# Patient Record
Sex: FEMALE | Race: OTHER | Employment: FULL TIME | URBAN - NONMETROPOLITAN AREA
[De-identification: names, ages, dates, MRNs, and addresses within clinical notes are randomized per-mention and may not be internally consistent; named-entity substitution may affect disease eponyms.]

---

## 2018-02-28 ENCOUNTER — HOSPITAL ENCOUNTER (INPATIENT)
Age: 18
LOS: 9 days | Discharge: HOME | DRG: 885 | End: 2018-03-09
Attending: EMERGENCY MEDICINE | Admitting: PSYCHIATRY & NEUROLOGY
Payer: COMMERCIAL

## 2018-02-28 DIAGNOSIS — F22 PARANOIA (HCC): Primary | ICD-10-CM

## 2018-02-28 LAB
ALBUMIN SERPL-MCNC: 4.3 G/DL (ref 3.5–5.2)
ALP BLD-CCNC: 48 U/L (ref 35–104)
ALT SERPL-CCNC: 10 U/L (ref 5–33)
AMPHETAMINE SCREEN, URINE: NEGATIVE
ANION GAP SERPL CALCULATED.3IONS-SCNC: 16 MMOL/L (ref 7–19)
AST SERPL-CCNC: 27 U/L (ref 5–32)
BARBITURATE SCREEN URINE: NEGATIVE
BASOPHILS ABSOLUTE: 0 K/UL (ref 0–0.2)
BASOPHILS RELATIVE PERCENT: 0.7 % (ref 0–1)
BENZODIAZEPINE SCREEN, URINE: NEGATIVE
BILIRUB SERPL-MCNC: 0.6 MG/DL (ref 0.2–1.2)
BILIRUBIN URINE: NEGATIVE
BLOOD, URINE: NEGATIVE
BUN BLDV-MCNC: 9 MG/DL (ref 6–20)
CALCIUM SERPL-MCNC: 9.3 MG/DL (ref 8.6–10)
CANNABINOID SCREEN URINE: NEGATIVE
CHLORIDE BLD-SCNC: 98 MMOL/L (ref 98–111)
CLARITY: ABNORMAL
CO2: 23 MMOL/L (ref 22–29)
COCAINE METABOLITE SCREEN URINE: NEGATIVE
COLOR: ABNORMAL
CREAT SERPL-MCNC: 0.6 MG/DL (ref 0.5–0.9)
EOSINOPHILS ABSOLUTE: 0 K/UL (ref 0–0.6)
EOSINOPHILS RELATIVE PERCENT: 0.5 % (ref 0–5)
ETHANOL: <10 MG/DL (ref 0–0.08)
GFR NON-AFRICAN AMERICAN: >60
GLUCOSE BLD-MCNC: 86 MG/DL (ref 74–109)
GLUCOSE URINE: NEGATIVE MG/DL
HCG(URINE) PREGNANCY TEST: NEGATIVE
HCT VFR BLD CALC: 39.8 % (ref 37–47)
HEMOGLOBIN: 12.8 G/DL (ref 12–16)
KETONES, URINE: NEGATIVE MG/DL
LEUKOCYTE ESTERASE, URINE: NEGATIVE
LYMPHOCYTES ABSOLUTE: 2.1 K/UL (ref 1.1–4.5)
LYMPHOCYTES RELATIVE PERCENT: 35.3 % (ref 20–40)
Lab: NORMAL
MCH RBC QN AUTO: 29.2 PG (ref 27–31)
MCHC RBC AUTO-ENTMCNC: 32.2 G/DL (ref 33–37)
MCV RBC AUTO: 90.9 FL (ref 81–99)
MONOCYTES ABSOLUTE: 0.3 K/UL (ref 0–0.9)
MONOCYTES RELATIVE PERCENT: 5.3 % (ref 0–10)
NEUTROPHILS ABSOLUTE: 3.4 K/UL (ref 1.5–7.5)
NEUTROPHILS RELATIVE PERCENT: 58 % (ref 50–65)
NITRITE, URINE: NEGATIVE
OPIATE SCREEN URINE: NEGATIVE
PDW BLD-RTO: 12.9 % (ref 11.5–14.5)
PH UA: 7.5
PLATELET # BLD: 223 K/UL (ref 130–400)
PMV BLD AUTO: 10.8 FL (ref 9.4–12.3)
POTASSIUM SERPL-SCNC: 4 MMOL/L (ref 3.5–5)
PROTEIN UA: ABNORMAL MG/DL
RBC # BLD: 4.38 M/UL (ref 4.2–5.4)
SALICYLATE, SERUM: <3 MG/DL (ref 3–10)
SODIUM BLD-SCNC: 137 MMOL/L (ref 136–145)
SPECIFIC GRAVITY UA: 1.02
TOTAL PROTEIN: 7.3 G/DL (ref 6.6–8.7)
UROBILINOGEN, URINE: 1 E.U./DL
WBC # BLD: 5.8 K/UL (ref 4.8–10.8)

## 2018-02-28 PROCEDURE — 81025 URINE PREGNANCY TEST: CPT

## 2018-02-28 PROCEDURE — 1240000000 HC EMOTIONAL WELLNESS R&B

## 2018-02-28 PROCEDURE — G0480 DRUG TEST DEF 1-7 CLASSES: HCPCS

## 2018-02-28 PROCEDURE — 85025 COMPLETE CBC W/AUTO DIFF WBC: CPT

## 2018-02-28 PROCEDURE — 99285 EMERGENCY DEPT VISIT HI MDM: CPT

## 2018-02-28 PROCEDURE — 80307 DRUG TEST PRSMV CHEM ANLYZR: CPT

## 2018-02-28 PROCEDURE — 99284 EMERGENCY DEPT VISIT MOD MDM: CPT | Performed by: EMERGENCY MEDICINE

## 2018-02-28 PROCEDURE — 80053 COMPREHEN METABOLIC PANEL: CPT

## 2018-02-28 PROCEDURE — 36415 COLL VENOUS BLD VENIPUNCTURE: CPT

## 2018-02-28 PROCEDURE — 81003 URINALYSIS AUTO W/O SCOPE: CPT

## 2018-02-28 RX ORDER — TRAZODONE HYDROCHLORIDE 50 MG/1
50 TABLET ORAL ONCE
Status: CANCELLED | OUTPATIENT
Start: 2018-02-28

## 2018-02-28 ASSESSMENT — ENCOUNTER SYMPTOMS
COLOR CHANGE: 0
CHEST TIGHTNESS: 0
ABDOMINAL PAIN: 0
SHORTNESS OF BREATH: 0
EYE PAIN: 0
DIARRHEA: 0
SORE THROAT: 0
PHOTOPHOBIA: 0
TROUBLE SWALLOWING: 0
CONSTIPATION: 0
ABDOMINAL DISTENTION: 0
COUGH: 0
NAUSEA: 0
BACK PAIN: 0
VOMITING: 0
RHINORRHEA: 0
WHEEZING: 0

## 2018-03-01 PROBLEM — F41.0 PANIC DISORDER: Status: ACTIVE | Noted: 2018-03-01

## 2018-03-01 PROBLEM — F32.3 SEVERE MAJOR DEPRESSION, SINGLE EPISODE, WITH PSYCHOTIC FEATURES (HCC): Status: ACTIVE | Noted: 2018-03-01

## 2018-03-01 PROBLEM — F12.11 CANNABIS ABUSE, IN REMISSION: Status: ACTIVE | Noted: 2018-03-01

## 2018-03-01 PROCEDURE — 1240000000 HC EMOTIONAL WELLNESS R&B

## 2018-03-01 PROCEDURE — 6370000000 HC RX 637 (ALT 250 FOR IP): Performed by: PSYCHIATRY & NEUROLOGY

## 2018-03-01 PROCEDURE — 90792 PSYCH DIAG EVAL W/MED SRVCS: CPT | Performed by: PSYCHIATRY & NEUROLOGY

## 2018-03-01 RX ORDER — ACETAMINOPHEN 325 MG/1
650 TABLET ORAL EVERY 4 HOURS PRN
Status: DISCONTINUED | OUTPATIENT
Start: 2018-03-01 | End: 2018-03-09 | Stop reason: HOSPADM

## 2018-03-01 RX ORDER — ZIPRASIDONE HYDROCHLORIDE 20 MG/1
40 CAPSULE ORAL NIGHTLY
Status: DISCONTINUED | OUTPATIENT
Start: 2018-03-01 | End: 2018-03-02

## 2018-03-01 RX ORDER — FLUOXETINE 10 MG/1
10 CAPSULE ORAL DAILY
Status: ON HOLD | COMMUNITY
End: 2018-03-09 | Stop reason: HOSPADM

## 2018-03-01 RX ORDER — HYDROXYZINE HYDROCHLORIDE 25 MG/1
25 TABLET, FILM COATED ORAL 3 TIMES DAILY PRN
Status: DISCONTINUED | OUTPATIENT
Start: 2018-03-01 | End: 2018-03-09 | Stop reason: HOSPADM

## 2018-03-01 RX ORDER — TRAZODONE HYDROCHLORIDE 50 MG/1
50 TABLET ORAL NIGHTLY PRN
Status: DISCONTINUED | OUTPATIENT
Start: 2018-03-01 | End: 2018-03-09 | Stop reason: HOSPADM

## 2018-03-01 RX ADMIN — TRAZODONE HYDROCHLORIDE 50 MG: 50 TABLET ORAL at 21:11

## 2018-03-01 RX ADMIN — ZIPRASIDONE HYDROCHLORIDE 40 MG: 20 CAPSULE ORAL at 21:10

## 2018-03-01 RX ADMIN — HYDROXYZINE HYDROCHLORIDE 25 MG: 25 TABLET ORAL at 20:00

## 2018-03-01 ASSESSMENT — SLEEP AND FATIGUE QUESTIONNAIRES
DO YOU HAVE DIFFICULTY SLEEPING: YES
DIFFICULTY ARISING: NO
DIFFICULTY FALLING ASLEEP: NO
RESTFUL SLEEP: NO
DO YOU USE A SLEEP AID: NO
SLEEP PATTERN: DISTURBED/INTERRUPTED SLEEP
DIFFICULTY STAYING ASLEEP: YES

## 2018-03-01 ASSESSMENT — LIFESTYLE VARIABLES: HISTORY_ALCOHOL_USE: NO

## 2018-03-01 ASSESSMENT — PATIENT HEALTH QUESTIONNAIRE - PHQ9: SUM OF ALL RESPONSES TO PHQ QUESTIONS 1-9: 27

## 2018-03-01 NOTE — H&P
[address] [place] [address]    Psychiatric Initial Evaluation    Date of Evaluation:  3/1/2018  Session Type:  63398 Psychiatric Diagnostic Interview Exam   Name:  Josiane Pedraza  Age:  21 y.o. Sex:  female  Ethnicity:   Primary Care Physician:  No primary care provider on file. Patient Care Team:  No care team member to display  Chief Complaint:  \"I can't tell what's the truth and what's a lie anymore. \"     History of Present Illness    Josiane Pedraza is a 21 y.o. female who presents to the emergency department for \"feeling like I am watching myself as a movie\" and feelings of paranoia. Patient states that for some time now she feels disassociated from reality. She states that she feels as if she is on the outside looking in. She has had paranoia for a while which she states she can control but here in the past few days it has become more pronounced. She tells me she typically takes Prozac for her depression, but her medication was changed. She feels like this new change has worsened the dissociative feelings as well as the paranoia. She is not experiencing any suicidal or homicidal ideations. She is not experiencing any auditory or visual hallucinations. She went to [address] [place] [address] who referred her here. Patient states, \"I had him bring me to the hospital because I was scared. I don't know what it is. I don't feel like I am here. I feel like I am in a movie and that everything is already planned and they know but I don't. It feels like I am not real and what is going on is not real.  I don't know how to explain it. I feel like I'm being observed, watched. I feel like everybody thinks that I am lying and that they all know something that I don't. I've felt like this before. I don't remember when. This time it's been for the last 2 days. For a long time, I've felt like everybody is planning against me and that they want me to suffer and that they are all in on it.   I've been to her mom again for the first time in five years. My mom used to yell at me a lot. She wouldn't let me do my own things-- so she left for her father's house and then we never talked after she left. Has been sleeping a lot. Felt she was too tired to go to class. Not sure if she's sad, \"I can't figure out if I'm telling myself to feel this way or not. \"   I can tell that what I'm freaking out about doesn't make sense but then I connect it in my brain . .. and everyone is against me. \"  Has had a lot of panic attacks. The most recent was 2 days in the morning. Friend said that she didn't want to be friends with her anymore. . because it doesn't actually seem like I wanted to be her friend. \"  Couldn't stop crying. Cries multiple times a day. Denies hx of manic symptoms. Allergies:    Allergies as of 02/28/2018 - Review Complete 02/28/2018   Allergen Reaction Noted    Pcn [penicillins]  02/28/2018       Vital Signs:  Last set of tests and vitals:  Vitals:    03/01/18 0835   BP: 95/66   Pulse: 67   Resp: 20   Temp: 97.9 °F (36.6 °C)   SpO2: 100%     Labs Reviewed   CBC WITH AUTO DIFFERENTIAL - Abnormal; Notable for the following:        Result Value    MCHC 32.2 (*)     All other components within normal limits   URINALYSIS - Abnormal; Notable for the following:     Clarity, UA CLOUDY (*)     Protein, UA TRACE (*)     All other components within normal limits   COMPREHENSIVE METABOLIC PANEL   ETHANOL   URINE DRUG SCREEN   PREGNANCY, URINE   SALICYLATE LEVEL       Current Medications:   Current Facility-Administered Medications   Medication Dose Route Frequency Provider Last Rate Last Dose    traZODone (DESYREL) tablet 50 mg  50 mg Oral Nightly PRN Shane Goodrich MD        acetaminophen (TYLENOL) tablet 650 mg  650 mg Oral Q4H PRN Shane Goodrich MD        magnesium hydroxide (MILK OF MAGNESIA) 400 MG/5ML suspension 30 mL  30 mL Oral Daily PRN Shane Goodrich MD           Previous Psychiatric,

## 2018-03-01 NOTE — BH NOTE
members with suicide attempt: no   If yes explain:   Family members who completed suicide: no  If yes explain:       Substance Abuse History:     SBIRT Completed:Yes     Current ETOH LEVELS: < 10    ETOH Abuse:   Patient states that she drinks occasionally socially. Substance/Chemical Abuse/Recreational Drug History:  Substance used: marijuana  Date of last substance use: last week   Substance treatment history: no  Family history of substance abuse: no    Tobacco use:No If yes frequency     Opiates: It was discussed with pt they would not be receiving opiates unless they were within 3 days post surgery/acute injury. Patient voiced understanding and agreed. Psychiatric Review Of Systems:     Recent Sleep changes: yes   Average hours per night: 6  With sleep aid: no   Restful sleep: no   Difficulty falling asleep: yes   Difficulty staying asleep: yes   Difficulty awakening: yes     Recent appetite changes: yes   Recent weight changes/Pounds gained (+) or lost (-): no        Energy level changes:  yes   Interest/pleasure/anhedonia:  yes     Medical History:     Medical Diagnosis/Issues:   CT today in ED:no  Use of 02 or CPAP: no  Ambulatory: yes  Independent Self Care: yes  Use of OTC: no   Somatic symptoms: no     PCP: No primary care provider on file. Current Medications:   Scheduled Meds: No current facility-administered medications for this encounter.      Current Outpatient Prescriptions:     VORTIoxetine (TRINTELLIX) 10 MG TABS tablet, Take 10 mg by mouth daily, Disp: , Rfl:        Mental Status Evaluation:     Appearance:  disheveled and piercings   Behavior:  Restless & fidgety   Speech:  normal pitch and normal volume   Mood:  anxious, depressed and sad   Affect:  normal and mood-congruent   Thought Process:  circumstantial   Thought Content:  delusions   Sensorium:  person, place, time/date, situation, day of week, month of year and year   Cognition:  grossly intact   Insight:  good   Judgment: good     Social Information:    Education: some college  Employment where   Full time student  Positive support system: Yes  Social Supports: yes and Friends        Disposition:       1.  Decision to admit to :yes    If yes, which unit Adult or Geriatric Unit:  Adult  Is patient voluntary: yes  Admission Diagnosis: paranoia      Checklist for [facility] staff:   Legal signed: Voluntary signed  Admission completed except as noted: no   Insurance Precert: no     YURIY had to work on [employer] unit at [employer]

## 2018-03-01 NOTE — PROGRESS NOTES
Admission Note      Reason for admission/Target Symptom: increasing depression and SI  Diagnoses:  UDS: Negative- Benzo- Opiate- Amphetamines- THC- Cocaine- Barbs  BAL: Negative     SW met with treatment team to discuss patient treatment and follow up care plans. Pt was admitted to [facility]. Treatment team has  identified patients discharge needs as medication management and therapy with [address] [place] [address]. Pt validated need for appointments. Pt was also provided a handout of contact information for drug and alcohol treatment centers and other community support service such as [facility] .

## 2018-03-01 NOTE — PROGRESS NOTES
Group Therapy Note    Start Time: 548  End Time:  930  Number of Participants: 17    Type of Group: Community Meeting       Patient's Goal:  \"feeling better\"      Notes:      Participation Level:  Active Listener       Participation Quality: Appropriate      Thought Process/Content: Logical      Affective Functioning: Congruent      Mood: calm      Level of consciousness:  Alert      Modes of Intervention: Support      Discipline Responsible: Behavioral Health Tech II      Signature:  Dottie Pena

## 2018-03-01 NOTE — ED NOTES
Pt's boyfriend took belongings to the car. Pt also has two in security lock up.         Stefani Clinton  02/28/18 1840

## 2018-03-01 NOTE — PROGRESS NOTES
Requirement Note     SW met with pt to complete Psychosocial within 72 hours, CSSRS within 24 hours, and treatment plan signature sheet within 72 hours. In the last 6 months has the pt been a danger to self: YES/  In the last 6 months has the pt been a danger to others: NO    Provided pt with [employer] handout entitled \"Quitting Smoking. \"  Reviewed handout with pt addressing dangers of smoking, developing coping skills, and providing basic information about quitting. Patient declined practical counseling of tobacco practical counseling during the hospital stay.

## 2018-03-01 NOTE — ED PROVIDER NOTES
Genitourinary: Negative for difficulty urinating, dysuria, flank pain, urgency, vaginal bleeding and vaginal discharge. Musculoskeletal: Negative for back pain, myalgias and neck stiffness. Skin: Negative for color change, pallor and rash. Neurological: Negative for dizziness, weakness, light-headedness, numbness and headaches. Psychiatric/Behavioral: Positive for dysphoric mood. Negative for agitation, behavioral problems, confusion, hallucinations, self-injury and suicidal ideas. The patient is nervous/anxious. PAST MEDICAL HISTORY   History reviewed. No pertinent past medical history. SURGICAL HISTORY     History reviewed. No pertinent surgical history. CURRENT MEDICATIONS       Current Discharge Medication List      CONTINUE these medications which have NOT CHANGED    Details   VORTIoxetine (TRINTELLIX) 10 MG TABS tablet Take 10 mg by mouth daily             ALLERGIES     Pcn [penicillins]    FAMILY HISTORY     History reviewed. No pertinent family history. SOCIAL HISTORY       Social History     Social History    Marital status: Single     Spouse name: N/A    Number of children: N/A    Years of education: N/A     Social History Main Topics    Smoking status: Never Smoker    Smokeless tobacco: Never Used    Alcohol use Yes      Comment: occasional    Drug use: Yes     Types: Marijuana    Sexual activity: Yes     Partners: Male     Other Topics Concern    None     Social History Narrative    None       SCREENINGS             PHYSICAL EXAM    (up to 7 for level 4, 8 or more for level 5)     ED Triage Vitals   BP Temp Temp Source Heart Rate Resp SpO2 Height Weight   02/28/18 1752 02/28/18 1752 02/28/18 2039 02/28/18 1752 02/28/18 1752 02/28/18 1752 02/28/18 1752 02/28/18 1752   111/76 98.6 °F (37 °C) Temporal 77 18 97 % 5' 2\" (1.575 m) 134 lb (60.8 kg)       Physical Exam   Constitutional: She is oriented to person, place, and time.  She appears well-developed and well-nourished. No distress. HENT:   Head: Normocephalic and atraumatic. Mouth/Throat: Oropharynx is clear and moist.   Eyes: Conjunctivae and EOM are normal. Pupils are equal, round, and reactive to light. Neck: Normal range of motion. Neck supple. No JVD present. Cardiovascular: Normal rate, regular rhythm and normal heart sounds. Exam reveals no gallop. No murmur heard. Pulmonary/Chest: Effort normal and breath sounds normal. She has no wheezes. She has no rales. Abdominal: Soft. Bowel sounds are normal. She exhibits no distension. There is no tenderness. There is no rebound and no guarding. Musculoskeletal: Normal range of motion. She exhibits no edema. Neurological: She is alert and oriented to person, place, and time. No cranial nerve deficit. Skin: Skin is warm and dry. No rash noted. Psychiatric: She has a normal mood and affect. Her speech is normal and behavior is normal. Judgment and thought content normal.   Nursing note and vitals reviewed. DIAGNOSTIC RESULTS     EKG: All EKG's are interpreted by the Emergency Department Physician who either signs or Co-signs this chart in the absence of a cardiologist.        RADIOLOGY:   Non-plain film images such as CT, Ultrasound and MRI are read by the radiologist. Plain radiographic images are visualized and preliminarily interpreted by the emergency physician with the below findings:          No orders to display           LABS:  Labs Reviewed   CBC WITH AUTO DIFFERENTIAL - Abnormal; Notable for the following:        Result Value    MCHC 32.2 (*)     All other components within normal limits   URINALYSIS - Abnormal; Notable for the following:     Clarity, UA CLOUDY (*)     Protein, UA TRACE (*)     All other components within normal limits   COMPREHENSIVE METABOLIC PANEL   ETHANOL   URINE DRUG SCREEN   PREGNANCY, URINE   SALICYLATE LEVEL       All other labs were within normal range or not returned as of this dictation.     EMERGENCY DEPARTMENT COURSE and DIFFERENTIAL DIAGNOSIS/MDM:   Vitals:    Vitals:    02/28/18 2039 02/28/18 2304 02/28/18 2320 02/28/18 2321   BP: 96/64 100/66 114/79    Pulse: 71 74 69    Resp: 16 16 18    Temp: 98.6 °F (37 °C) 97.5 °F (36.4 °C) 98.4 °F (36.9 °C)    TempSrc: Temporal  Temporal    SpO2: 95% 96% 100%    Weight:    130 lb 3.2 oz (59.1 kg)   Height:           MDM  Number of Diagnoses or Management Options  [facility]): new and requires workup  Diagnosis management comments: Psychiatrist come to evaluate patient and feel that admitting her for her paranoia is warranted. Patient Progress  Patient progress: stable      Reassessment      CONSULTS:  None    PROCEDURES:  Unless otherwise noted below, none     Procedures    FINAL IMPRESSION      1. [facility])          DISPOSITION/PLAN   DISPOSITION Decision To Admit 02/28/2018 11:07:29 PM      PATIENT REFERRED TO:  No follow-up provider specified.     DISCHARGE MEDICATIONS:  Current Discharge Medication List             (Please note that portions of this note were completed with a voice recognition program.  Efforts were made to edit the dictations but occasionally words are mis-transcribed.)    Nohelia Castro MD (electronically signed)  Attending Emergency Physician          Mimi Jackson MD  02/28/18 4708

## 2018-03-02 LAB
TSH SERPL DL<=0.05 MIU/L-ACNC: 2.73 UIU/ML (ref 0.27–4.2)
VITAMIN B-12: 309 PG/ML (ref 211–946)
VITAMIN D 25-HYDROXY: 18.9 NG/ML

## 2018-03-02 PROCEDURE — 36415 COLL VENOUS BLD VENIPUNCTURE: CPT

## 2018-03-02 PROCEDURE — 6370000000 HC RX 637 (ALT 250 FOR IP): Performed by: FAMILY MEDICINE

## 2018-03-02 PROCEDURE — 1240000000 HC EMOTIONAL WELLNESS R&B

## 2018-03-02 PROCEDURE — 82607 VITAMIN B-12: CPT

## 2018-03-02 PROCEDURE — 84443 ASSAY THYROID STIM HORMONE: CPT

## 2018-03-02 PROCEDURE — 99233 SBSQ HOSP IP/OBS HIGH 50: CPT | Performed by: PSYCHIATRY & NEUROLOGY

## 2018-03-02 PROCEDURE — 93005 ELECTROCARDIOGRAM TRACING: CPT

## 2018-03-02 PROCEDURE — 82306 VITAMIN D 25 HYDROXY: CPT

## 2018-03-02 PROCEDURE — 6370000000 HC RX 637 (ALT 250 FOR IP): Performed by: PSYCHIATRY & NEUROLOGY

## 2018-03-02 RX ORDER — ZIPRASIDONE HYDROCHLORIDE 60 MG/1
60 CAPSULE ORAL NIGHTLY
Status: DISCONTINUED | OUTPATIENT
Start: 2018-03-02 | End: 2018-03-04

## 2018-03-02 RX ORDER — ERGOCALCIFEROL (VITAMIN D2) 1250 MCG
50000 CAPSULE ORAL WEEKLY
Qty: 11 CAPSULE | Refills: 0 | Status: SHIPPED | OUTPATIENT
Start: 2018-03-02 | End: 2018-03-09

## 2018-03-02 RX ORDER — CHOLECALCIFEROL (VITAMIN D3) 125 MCG
500 CAPSULE ORAL DAILY
Status: DISCONTINUED | OUTPATIENT
Start: 2018-03-02 | End: 2018-03-09 | Stop reason: HOSPADM

## 2018-03-02 RX ORDER — ERGOCALCIFEROL 1.25 MG/1
50000 CAPSULE ORAL WEEKLY
Status: DISCONTINUED | OUTPATIENT
Start: 2018-03-02 | End: 2018-03-09 | Stop reason: HOSPADM

## 2018-03-02 RX ADMIN — CYANOCOBALAMIN TAB 500 MCG 500 MCG: 500 TAB at 10:53

## 2018-03-02 RX ADMIN — ERGOCALCIFEROL 50000 UNITS: 1.25 CAPSULE ORAL at 10:53

## 2018-03-02 RX ADMIN — ACETAMINOPHEN 650 MG: 325 TABLET, FILM COATED ORAL at 12:44

## 2018-03-02 ASSESSMENT — PAIN SCALES - GENERAL: PAINLEVEL_OUTOF10: 8

## 2018-03-02 NOTE — PLAN OF CARE
Problem: Altered Mood, Depressive Behavior:  Goal: Able to verbalize acceptance of life and situations over which he or she has no control  Able to verbalize acceptance of life and situations over which he or she has no control   Outcome: Ongoing  Group Therapy Note     Date: 3/2/2018  Start Time: 1100  End Time:  1145  Number of Participants: 8     Type of Group: Cognitive Skills     Wellness Binder Information  Module Name:  Stress   Session Number:  3     Patient's Goal:  Preventing Stress      Notes:  Pt did not attend group, though invited and encouraged     Status After Intervention:       Participation Level:      Participation Quality:         Speech:          Thought Process/Content:         Affective Functioning:         Mood:         Level of consciousness:          Response to Learning:         Endings:      Modes of Intervention:         Discipline Responsible: Psychoeducational Specialist        Signature:   Gill Ahmadi

## 2018-03-02 NOTE — PLAN OF CARE
Problem: Altered Mood, Depressive Behavior:  Goal: Patient specific goal  Patient specific goal   Outcome: Ongoing                                                                      Group Therapy Note    Date: 3/2/2018  Start Time: 6524  End Time:  1600  Number of Participants: 7    Type of Group: Recovery    Wellness Binder Information  Module Name:  Emotional wellness  Session Number:  5    Patient's Goal:  Obstacles to emotional wellness    Notes:  Pt acknowledged limited perspectives can be an obstacle to emotional wellness.     Status After Intervention:  Improved    Participation Level: Interactive    Participation Quality: Appropriate, Attentive and Sharing      Speech:  normal      Thought Process/Content: Logical      Affective Functioning: Congruent      Mood: congruent      Level of consciousness:  Alert, Oriented x4 and Attentive      Response to Learning: Able to verbalize current knowledge/experience      Endings: None Reported    Modes of Intervention: Education      Discipline Responsible: Psychoeducational Specialist      Signature:  Armando Stone

## 2018-03-02 NOTE — PROGRESS NOTES
BHI Daily Shift Assessment  Nursing Progress Note    Room: [facility]609-01 Name: Michelle Walker Age: 21 y.o. Ethnicity: -American Gender: female   Dx: Severe major depression, single episode, with psychotic features ([address]  Precautions: suicide risk  CPAP: No Accu-Chek: No  MSE:  Status and Exam  Normal: No  Facial Expression: Flat  Affect: Congruent  Level of Consciousness: Alert  Mood:Normal: No  Mood: Depressed, Anxious  Motor Activity:Normal: No  Motor Activity: Decreased  Interview Behavior: Cooperative, Evasive  Preception: [place] to Person, Coralee Pace to Time, [place] to Place, [place] to Situation  Attention:Normal: No  Attention: Distractible  Thought Processes: Other(See comment) (racing)  Thought Content:Normal: No  Thought Content: Preoccupations  Hallucinations: None  Delusions: Yes  Delusions: Persecution  Memory:Normal: No  Memory: Poor Recent, Poor Remote  Insight and Judgment: No  Insight and Judgment: Poor Judgment, Poor Insight  Present Suicidal Ideation: No  Present Homicidal Ideation: No  Sleep: Yes, Good, has difficulty awakening Sched Sleep Meds: Yes PRN Sleep Meds: Yes Other PRN Meds: Yes Med Compliant: Yes Appetite: no change from normal Percent Meals: 75% Social: No ADLs: No Speech: hesitant Depression: 0 Anxiety: 0    Staci Ramos RN    Pt lying in bed during interview. Pt calm and cooperative. Pt avoids eye contact and is hesitant to speak. Pt denies si, hi, and avh. Pt denies depression and anxiety. Pt reports good sleep but has slept excessively this morning. Pt is not social with others and isolates to room. Pt reports normal appetite but did not eat breakfast this morning. Pt did not go to group and did not do ADLs.

## 2018-03-02 NOTE — PROGRESS NOTES
Treatment Team Note:    MARKO met with [address] [address] team to discuss Pts [address] plans. Progress/Behavior/Group Attendance: TBD    Target Symptoms/Reason for admission:     Diagnoses:     UDS: Neg- Benzo-Opiate- Amphetamines- THC-Cocaine- Barbs     BAL: Neg    AftercarePlan: [address] lives with: MARKO will meet with pt to gather information. Collateral obtained from: MARKO will meet with pt to gather release of information.   On:    Family Session: CODY    Misc:

## 2018-03-02 NOTE — PLAN OF CARE
to perform activities of daily living will improve   Outcome: Ongoing    Goal: Able to verbalize reality based thinking  Able to verbalize reality based thinking   Outcome: Ongoing    Goal: Skin appearance normal  Skin appearance normal   Outcome: Ongoing    Goal: Maintenance of adequate nutrition will improve  Maintenance of adequate nutrition will improve   Outcome: Ongoing    Goal: Ability to tolerate increased activity will improve  Ability to tolerate increased activity will improve   Outcome: Ongoing    Goal: Participates in care planning  Participates in care planning   Outcome: Ongoing    Goal: Patient specific goal  Patient specific goal   Outcome: Ongoing      Problem: Risk of Harm:  Goal: Ability to remain free from injury will improve  Ability to remain free from injury will improve   Outcome: Ongoing

## 2018-03-02 NOTE — H&P
HISTORY and PHYSICAL      CHIEF COMPLAINT: psychosis    Reason for Admission:  psychosis    History Obtained From:  patient    HISTORY OF PRESENT ILLNESS:      The patient is a 21 y.o. female who is admitted to the [place] [address] unit with worsening mood issues. No CP or SOA. No abdominal pain or N/V. No abdominal pain or N/V. No dysuria. No recent illnesses or fevers. She has no c/o pain currently. No HA. She has c/o hair loss, left side greater than right. No skin changes or changes in BM's. Past Medical History:    History reviewed. No pertinent past medical history. Past Surgical History:    History reviewed. No pertinent surgical history. Medications Prior to Admission:    Prescriptions Prior to Admission: FLUoxetine (PROZAC) 10 MG capsule, Take 10 mg by mouth daily  VORTIoxetine (TRINTELLIX) 10 MG TABS tablet, Take 10 mg by mouth daily    Allergies:  Pcn [penicillins]    Social History:   TOBACCO:   reports that she has never smoked. She has never used smokeless tobacco.  ETOH:   reports that she drinks alcohol. DRUGS:   reports that she uses drugs, including Marijuana. MARITAL STATUS:  single  OCCUPATION: in school in [place]  Patient currently lives in a dorm      Family History:   History reviewed. No pertinent family history.   REVIEW OF SYSTEMS:  Constitutional: neg  CV: neg  Pulmonary: neg  GI: neg  : neg  Psych: psychosis  Neuro: neg  Skin: neg  MusculoSkeletal: neg  HEENT: neg  Joints: neg    Vitals:  /65   Pulse 70   Temp 98.7 °F (37.1 °C) (Temporal)   Resp 18   Ht 5' 2\" (1.575 m)   Wt 130 lb 3.2 oz (59.1 kg)   LMP 02/21/2018   SpO2 98%   BMI 23.81 kg/m²     PHYSICAL EXAM:  Gen: NAD, alert  HEENT: WNL  Lymph: no LAD  Neck: no JVD or masses  Chest: CTA bilat  CV: RRR  Abdomen: NT/ND  Extrem: no C/C/E  Neuro: non focal  Skin: no rashes  Joints: no redness    DATA:  I have reviewed the admission labs and imaging tests.    ASSESSMENT AND PLAN:      Patient Active Hospital Problem List:   Severe major depression, single episode, with psychotic features----follow with Psych   THC Use   Hair Loss--I will order additional testing          Yoshi Mcclure MD  12:51 AM 3/2/2018

## 2018-03-02 NOTE — PLAN OF CARE
Problem: Altered Mood, Depressive Behavior:  Goal: Patient specific goal  Patient specific goal   Outcome: Ongoing                                                                      Group Therapy Note    Date: 3/2/2018  Start Time: 1000  End Time:  3777  Number of Participants: 12    Type of Group: Psychoeducation    Wellness Binder Information  Module Name:  stress  Session Number:  3    Patient's Goal:  Preventing stress    Notes:  Pt was verbally prompted to attend group. Pt refused. Information about preventing stress was provided. Status After Intervention:      Participation Level:     Participation Quality:       Speech:         Thought Process/Content:       Affective Functioning:       Mood:       Level of consciousness:        Response to Learning:       Endings:     Modes of Intervention:       Discipline Responsible: Psychoeducational Specialist      Signature:  Courtney Child

## 2018-03-03 PROCEDURE — 6370000000 HC RX 637 (ALT 250 FOR IP): Performed by: PSYCHIATRY & NEUROLOGY

## 2018-03-03 PROCEDURE — 6370000000 HC RX 637 (ALT 250 FOR IP): Performed by: FAMILY MEDICINE

## 2018-03-03 PROCEDURE — 99231 SBSQ HOSP IP/OBS SF/LOW 25: CPT | Performed by: PSYCHIATRY & NEUROLOGY

## 2018-03-03 PROCEDURE — 1240000000 HC EMOTIONAL WELLNESS R&B

## 2018-03-03 RX ADMIN — ZIPRASIDONE HYDROCHLORIDE 60 MG: 60 CAPSULE ORAL at 21:09

## 2018-03-03 RX ADMIN — CYANOCOBALAMIN TAB 500 MCG 500 MCG: 500 TAB at 10:06

## 2018-03-03 NOTE — PLAN OF CARE
Problem: Altered Mood, Depressive Behavior:  Goal: Able to verbalize acceptance of life and situations over which he or she has no control  Able to verbalize acceptance of life and situations over which he or she has no control   Outcome: Ongoing    Goal: Able to verbalize and/or display a decrease in depressive symptoms  Able to verbalize and/or display a decrease in depressive symptoms   Outcome: Ongoing    Goal: Ability to disclose and discuss suicidal ideas will improve  Ability to disclose and discuss suicidal ideas will improve   Outcome: Ongoing    Goal: Able to verbalize support systems  Able to verbalize support systems   Outcome: Ongoing    Goal: Absence of self-harm  Absence of self-harm   Outcome: Met This Shift    Goal: Patient specific goal  Patient specific goal   Outcome: Ongoing    Goal: Participates in care planning  Participates in care planning   Outcome: Ongoing      Problem: Depressive Behavior With or Without Suicide Precautions:  Goal: Able to verbalize acceptance of life and situations over which he or she has no control  Able to verbalize acceptance of life and situations over which he or she has no control   Outcome: Ongoing    Goal: Able to verbalize and/or display a decrease in depressive symptoms  Able to verbalize and/or display a decrease in depressive symptoms   Outcome: Ongoing    Goal: Ability to disclose and discuss suicidal ideas will improve  Ability to disclose and discuss suicidal ideas will improve   Outcome: Ongoing    Goal: Able to verbalize support systems  Able to verbalize support systems   Outcome: Ongoing    Goal: Absence of self-harm  Absence of self-harm   Outcome: Met This Shift    Goal: Patient specific goal  Patient specific goal   Outcome: Ongoing    Goal: Participates in care planning  Participates in care planning   Outcome: Ongoing      Problem: Altered Mood, Deterioration in Function:  Goal: Ability to perform activities of daily living will improve  Ability to perform activities of daily living will improve   Outcome: Ongoing    Goal: Able to verbalize reality based thinking  Able to verbalize reality based thinking   Outcome: Ongoing    Goal: Skin appearance normal  Skin appearance normal   Outcome: Ongoing    Goal: Maintenance of adequate nutrition will improve  Maintenance of adequate nutrition will improve   Outcome: Ongoing    Goal: Ability to tolerate increased activity will improve  Ability to tolerate increased activity will improve   Outcome: Ongoing    Goal: Participates in care planning  Participates in care planning   Outcome: Ongoing    Goal: Patient specific goal  Patient specific goal   Outcome: Ongoing      Problem: Risk of Harm:  Goal: Ability to remain free from injury will improve  Ability to remain free from injury will improve   Outcome: Ongoing

## 2018-03-03 NOTE — PLAN OF CARE
Problem: Altered Mood, Depressive Behavior:  Goal: Able to verbalize support systems  Able to verbalize support systems   Outcome: Ongoing                                                                      Group Therapy Note    Date: 3/3/2018  Start Time: 1115  End Time:  1200  Number of Participants: 8    Type of Group: Cognitive Skills    Wellness Binder Information  Module Name: Men's Issues  Session Number:  1. Patient's Goal: Managing Stress    Notes: Pt acknowledged that utilizing their support system, tending to their basic needs, and successful emotional management are all ways to reduce the negative effects of stress. Status After Intervention:  Improved    Participation Level:  Active Listener    Participation Quality: Appropriate and Attentive      Speech:  normal      Thought Process/Content: Logical      Affective Functioning: Congruent      Mood: depressed      Level of consciousness:  Alert, Oriented x4 and Attentive      Response to Learning: Able to verbalize current knowledge/experience and Progressing to goal      Endings: None Reported    Modes of Intervention: Support      Discipline Responsible: Psychoeducational Specialist      Signature:  Rosa Boston

## 2018-03-03 NOTE — PROGRESS NOTES
Progress Note  Doreen Apo  3/2/2018 11:40 PM  Subjective:   Admit Date:   2/28/2018      CC/ADMIT DX:       Interval History:   Reviewed overnight events and nursing notes. No new physical complaints. I have reviewed all labs/diagnostics from the last 24hrs. ROS:   I have done a 10 point ROS and all are negative, except what is mentioned in the HPI. DIET GENERAL;    Medications:      vitamin D  50,000 Units Oral Weekly    vitamin B-12  500 mcg Oral Daily    ziprasidone  60 mg Oral Nightly           Objective:   Vitals: /70 Comment: manual  Pulse 72   Temp 97.7 °F (36.5 °C) (Temporal)   Resp 18   Ht 5' 2\" (1.575 m)   Wt 130 lb 3.2 oz (59.1 kg)   LMP 02/21/2018   SpO2 100%   BMI 23.81 kg/m²  No intake or output data in the 24 hours ending 03/02/18 2340  General appearance: alert and cooperative with exam  Lungs: clear to auscultation bilaterally  Heart: regular rate and rhythm, S1, S2 normal, no murmur, click, rub or gallop  Abdomen: soft, non-tender; bowel sounds normal; no masses,  no organomegaly  Extremities: extremities normal, atraumatic, no cyanosis or edema  Neurologic:  No obvious focal neurologic deficits. Assessment and Plan:   Principal Problem:    Severe major depression, single episode, with psychotic features ([address]  Active Problems:    Panic disorder    Cannabis abuse, in remission  Resolved Problems:    * No resolved hospital problems. *    Vit D Def    Plan:  1. Continue present medication(s)   2. Replace Vit D  3. Follow with Psych      Discharge planning:   her home     Reviewed treatment plans with the patient and/or family.              Electronically signed by Ana Ayoub MD on 3/2/2018 at 11:40 PM

## 2018-03-04 PROCEDURE — 6370000000 HC RX 637 (ALT 250 FOR IP): Performed by: PSYCHIATRY & NEUROLOGY

## 2018-03-04 PROCEDURE — 6370000000 HC RX 637 (ALT 250 FOR IP): Performed by: FAMILY MEDICINE

## 2018-03-04 PROCEDURE — 1240000000 HC EMOTIONAL WELLNESS R&B

## 2018-03-04 RX ORDER — ZIPRASIDONE HYDROCHLORIDE 80 MG/1
80 CAPSULE ORAL NIGHTLY
Status: DISCONTINUED | OUTPATIENT
Start: 2018-03-04 | End: 2018-03-09 | Stop reason: HOSPADM

## 2018-03-04 RX ADMIN — ZIPRASIDONE HYDROCHLORIDE 80 MG: 80 CAPSULE ORAL at 21:16

## 2018-03-04 RX ADMIN — CYANOCOBALAMIN TAB 500 MCG 500 MCG: 500 TAB at 08:35

## 2018-03-04 NOTE — PROGRESS NOTES
B[facility] Daily Shift Assessment  Nursing Progress Note    Room: [facility]/609-01 Name: Radha Ramírez Age: 21 y.o. Ethnicity: -American Gender: female   Dx: Severe major depression, single episode, with psychotic features ([address]  Precautions: suicide risk  CPAP: No Accu-Chek: No  MSE:  Status and Exam  Normal: Yes  Facial Expression: Brightened  Affect: Congruent  Level of Consciousness: Alert  Mood:Normal: Yes  Mood: Depressed, Anxious  Motor Activity:Normal: Yes  Motor Activity: Decreased  Interview Behavior: Cooperative  Preception: [place] to Person, [address] to Time, [place] to Place, [place] to Situation  Attention:Normal: No  Attention: Distractible  Thought Processes: Blocking  Thought Content:Normal: No  Thought Content: Poverty of Content  Hallucinations: None  Delusions: No  Delusions: Persecution  Memory:Normal: Yes  Memory: Poor Recent, Poor Remote  Insight and Judgment: No  Insight and Judgment: Unrealistic  Present Suicidal Ideation: No  Present Homicidal Ideation: No  Sleep: Yes, Good, no sleep issues Sched Sleep Meds: No PRN Sleep Meds: Yes Other PRN Meds: Yes Med Compliant: Yes Appetite: no change from normal Percent Meals: 75% Social: Yes ADLs: Yes Speech: normal Depression: 0 Anxiety: 0    Staci Ramos RN    Pt in room during interview. Pt calm and cooperative. Pt denies si, hi, and avh. Pt denies depression and anxiety. Pt reports good sleep and normal appetite. Pt is social with others. Pt goes to group and does ADLs.

## 2018-03-04 NOTE — PROGRESS NOTES
Pt calm and cooperative, A&Ox4 at med [facility]. Pt is social, attends group, and is med compliant. Pt states she is eating and sleeping well. Pt rates depression a 5, anxiety an 8, denies si, hi, and avh.

## 2018-03-05 LAB
BILIRUBIN URINE: NEGATIVE
BLOOD, URINE: NEGATIVE
CLARITY: ABNORMAL
COLOR: YELLOW
EKG P AXIS: -3 DEGREES
EKG P-R INTERVAL: 132 MS
EKG Q-T INTERVAL: 402 MS
EKG QRS DURATION: 76 MS
EKG QTC CALCULATION (BAZETT): 397 MS
EKG T AXIS: 58 DEGREES
GLUCOSE URINE: NEGATIVE MG/DL
KETONES, URINE: NEGATIVE MG/DL
LEUKOCYTE ESTERASE, URINE: NEGATIVE
NITRITE, URINE: NEGATIVE
PH UA: 7.5
PROTEIN UA: NEGATIVE MG/DL
SPECIFIC GRAVITY UA: 1.02
UROBILINOGEN, URINE: 1 E.U./DL

## 2018-03-05 PROCEDURE — 6370000000 HC RX 637 (ALT 250 FOR IP): Performed by: PSYCHIATRY & NEUROLOGY

## 2018-03-05 PROCEDURE — 1240000000 HC EMOTIONAL WELLNESS R&B

## 2018-03-05 PROCEDURE — 81003 URINALYSIS AUTO W/O SCOPE: CPT

## 2018-03-05 PROCEDURE — 6370000000 HC RX 637 (ALT 250 FOR IP): Performed by: FAMILY MEDICINE

## 2018-03-05 PROCEDURE — 99232 SBSQ HOSP IP/OBS MODERATE 35: CPT | Performed by: PSYCHIATRY & NEUROLOGY

## 2018-03-05 RX ADMIN — CYANOCOBALAMIN TAB 500 MCG 500 MCG: 500 TAB at 08:22

## 2018-03-05 RX ADMIN — ZIPRASIDONE HYDROCHLORIDE 80 MG: 80 CAPSULE ORAL at 20:38

## 2018-03-05 NOTE — PLAN OF CARE
Problem: Altered Mood, Depressive Behavior:  Goal: Patient specific goal  Patient specific goal   Outcome: Ongoing                                                                      Group Therapy Note    Date: 3/5/2018  Start Time: 1100  End Time:  5069  Number of Participants: 10    Type of Group: Psychoeducation    Wellness Binder Information  Module Name:  Emotional wellness  Session Number:  5    Patient's Goal:  Obstacles to emotional wellness    Notes:  Pt acknowledged negative thinking as an obstacle to emotional wellness.     Status After Intervention:  Improved    Participation Level: Interactive    Participation Quality: Appropriate, Attentive and Sharing      Speech:  normal      Thought Process/Content: Logical      Affective Functioning: Congruent      Mood: congruent      Level of consciousness:  Alert, Oriented x4 and Attentive      Response to Learning: Able to verbalize current knowledge/experience      Endings: None Reported    Modes of Intervention: Education      Discipline Responsible: Psychoeducational Specialist      Signature:  Terrance Michael

## 2018-03-05 NOTE — PROGRESS NOTES
Patient is alert oriented to person time place and situation, patient is cooperative with care and medication. Patient is social and attends groups, patient is social and attends groups. Patient at this time denies AVH, SI & HI.   Electronically signed by Talisha Brian RN on 3/5/2018 at 10:46 AM

## 2018-03-05 NOTE — PROGRESS NOTES
Group Therapy Note    Start Time: 0900  End Time:  0930  Number of Participants: 12    Type of Group: Community Meeting       Patient's Goal:  \" Going home and being ready to resume daily life. \"    Notes:      Participation Level:  Active Listener       Participation Quality: Appropriate      Thought Process/Content: Logical      Affective Functioning: Congruent      Mood: calm      Level of consciousness:  Alert      Modes of Intervention: Support      Discipline Responsible: Behavioral Health Tech II      Signature:  Victoria Daniel

## 2018-03-05 NOTE — BH NOTE
Pt participated in filling out her wrap up group paper, but did not attend relaxation or recreational groups. She is pleasant but isolative, reading a book in either the lounge or her room tonight.

## 2018-03-05 NOTE — PLAN OF CARE
Problem: Altered Mood, Depressive Behavior:  Goal: Ability to disclose and discuss suicidal ideas will improve  Ability to disclose and discuss suicidal ideas will improve   Outcome: Ongoing    Goal: Absence of self-harm  Absence of self-harm   Outcome: Ongoing    Goal: Participates in care planning  Participates in care planning   Outcome: Ongoing      Problem: Depressive Behavior With or Without Suicide Precautions:  Goal: Able to verbalize acceptance of life and situations over which he or she has no control  Able to verbalize acceptance of life and situations over which he or she has no control   Outcome: Ongoing    Goal: Able to verbalize and/or display a decrease in depressive symptoms  Able to verbalize and/or display a decrease in depressive symptoms   Outcome: Ongoing    Goal: Ability to disclose and discuss suicidal ideas will improve  Ability to disclose and discuss suicidal ideas will improve   Outcome: Ongoing    Goal: Able to verbalize support systems  Able to verbalize support systems   Outcome: Ongoing    Goal: Absence of self-harm  Absence of self-harm   Outcome: Ongoing    Goal: Patient specific goal  Patient specific goal   Outcome: Ongoing    Goal: Participates in care planning  Participates in care planning   Outcome: Ongoing      Problem: Altered Mood, Deterioration in Function:  Goal: Able to verbalize reality based thinking  Able to verbalize reality based thinking   Outcome: Ongoing    Goal: Skin appearance normal  Skin appearance normal   Outcome: Ongoing    Goal: Maintenance of adequate nutrition will improve  Maintenance of adequate nutrition will improve   Outcome: Ongoing    Goal: Ability to tolerate increased activity will improve  Ability to tolerate increased activity will improve   Outcome: Ongoing    Goal: Participates in care planning  Participates in care planning   Outcome: Ongoing    Goal: Patient specific goal  Patient specific goal   Outcome: Ongoing      Problem: Risk of

## 2018-03-06 PROBLEM — F29 PSYCHOSIS (HCC): Status: ACTIVE | Noted: 2018-03-06

## 2018-03-06 PROCEDURE — 1240000000 HC EMOTIONAL WELLNESS R&B

## 2018-03-06 PROCEDURE — 99232 SBSQ HOSP IP/OBS MODERATE 35: CPT | Performed by: PSYCHIATRY & NEUROLOGY

## 2018-03-06 PROCEDURE — 6370000000 HC RX 637 (ALT 250 FOR IP): Performed by: PSYCHIATRY & NEUROLOGY

## 2018-03-06 PROCEDURE — 6370000000 HC RX 637 (ALT 250 FOR IP): Performed by: FAMILY MEDICINE

## 2018-03-06 RX ORDER — ZIPRASIDONE HYDROCHLORIDE 20 MG/1
20 CAPSULE ORAL NIGHTLY
Status: DISCONTINUED | OUTPATIENT
Start: 2018-03-06 | End: 2018-03-09 | Stop reason: HOSPADM

## 2018-03-06 RX ADMIN — CYANOCOBALAMIN TAB 500 MCG 500 MCG: 500 TAB at 08:25

## 2018-03-06 RX ADMIN — ZIPRASIDONE HYDROCHLORIDE 80 MG: 80 CAPSULE ORAL at 20:07

## 2018-03-06 NOTE — PROGRESS NOTES
ROS: 10 point review of systems is negative except for above. Previous Psychiatric/Substance Use History      Medical History:  History reviewed. No pertinent past medical history. ROBERTS History:   History   Alcohol Use    Yes     Comment: occasional         History   Drug Use    Types: Marijuana        History   Smoking Status    Never Smoker   Smokeless Tobacco    Never Used        Family History:     Mom - bipolar    Vital Signs:  Last set of tests and vitals:  Vitals:    03/05/18 1918   BP: 113/74   Pulse: 83   Resp: 18   Temp: 98.2 °F (36.8 °C)   SpO2: 99%        Physical Exam: Gait steady. Speaks in full sentences without shortness of air. Mental Status:  Level of consciousness:  within normal limits  Appearance:  well-appearing, good grooming and hygiene, in casual clothes. Behavior/Motor:  no abnormalities noted  Attitude toward examiner:  cooperative and good eye contact  Speech:  spontaneous, normal rate, and well articulated, soft spoken  Mood: \"good\"  Affect: constricted   Thought processes:  linear, goal directed and coherent, future-oriented  Thought content:         Homocidal ideation:  denies                             Suicidal Ideation:  denies        Delusions:  +paranoia       Perceptual Disturbance:  denies any perceptual disturbance. Does not appear to be responding to internal stimuli.   Cognition:  oriented to person, place, and time  Concentration: fair  Memory: grossly intact  Insight: fair  Judgment:  fair    Current Medications:  Current Facility-Administered Medications   Medication Dose Route Frequency Provider Last Rate Last Dose    ziprasidone (GEODON) capsule 80 mg  80 mg Oral Nightly Margot Canas MD   80 mg at 03/05/18 2038    vitamin D (ERGOCALCIFEROL) capsule 50,000 Units  50,000 Units Oral Weekly Lauri Thapa MD   50,000 Units at 03/02/18 1053    vitamin B-12 (CYANOCOBALAMIN) tablet 500 mcg  500 mcg Oral Daily Lauri Thapa MD   500 mcg at 03/05/18 0776    traZODone (DESYREL) tablet 50 mg  50 mg Oral Nightly PRN Matheus Mar MD   50 mg at 03/01/18 2111    acetaminophen (TYLENOL) tablet 650 mg  650 mg Oral Q4H PRN Matheus Mar MD   650 mg at 03/02/18 1244    magnesium hydroxide (MILK OF MAGNESIA) 400 MG/5ML suspension 30 mL  30 mL Oral Daily PRN Matheus Mar MD        hydrOXYzine (ATARAX) tablet 25 mg  25 mg Oral TID PRN Batsheva Sosa MD   25 mg at 03/01/18 2000       Psychotherapy: Supportive. DSM V Diagnoses:    Schizophrenia and other psychotic disorders (unspecified psychosis) - r/o MDD with psychotic features, r/o brief psychosis, r/o schizophreniform    ACTIVE MEDICAL PROBLEMS:  Patient Active Problem List   Diagnosis    Severe major depression, single episode, with psychotic features ([facility]    Panic disorder    Cannabis abuse, in remission    Psychosis         Plan:   -Continue hospitalization for safety and stabilization  -Monitor every 15 minutes for safety  -The risks, benefits, side effects, indications, contraindications, and adverse effects of the medications have been discussed.  -The patient has verbalized understanding and has capacity to give informed consent.  -Encourage participation in groups and therapeutic activities as appropriate.   -Dr. Oumou Freed is following patient's labs and physical medical problems.   -Continue current medications unchanged, as Geodon was just increased yesterday  -Continue supportive psychotherapy - discussed treatment, possible diagnoses, prognosis  -Discuss with Treatment Team    The patient continues to need, on a daily basis, active treatment furnished directly by or requiring the supervision of inpatient psychiatric facility personnel. Amount of time spent with patient:  23 minutes with greater than 50% of the time spent in counseling and collaboration of care.     MD Name: Rod Woods, Psychiatrist  Clinician Signature: signed electronically

## 2018-03-06 NOTE — PROGRESS NOTES
Treatment Team Note:    MARKO met with [address] [address] team to discuss Pts [address] plans. Progress/Behavior/Group Attendance: TBD    Target Symptoms/Reason for admission:     Diagnoses:     UDS: Neg- Benzo-Opiate- Amphetamines- THC-Cocaine- Barbs     BAL: Neg    AftercarePlan: [address] lives with: MARKO will meet with pt to gather information. Collateral obtained from: MARKO will meet with pt to gather release of information.   On:    Family Session: CODY    Misc:

## 2018-03-06 NOTE — PLAN OF CARE
Problem: Altered Mood, Depressive Behavior:  Goal: Patient specific goal  Patient specific goal   Outcome: Ongoing                                                                      Group Therapy Note    Date: 3/6/2018  Start Time: 1000  End Time:  0319  Number of Participants: 11    Type of Group: Psychoeducation    Wellness Binder Information  Module Name:  Emotional wellness  Session Number:  1    Patient's Goal:  Validation of feelings    Notes:  Pt acknowledged to have feelings validated you must share feelings with others.     Status After Intervention:  Improved    Participation Level: Interactive    Participation Quality: Appropriate, Attentive and Sharing      Speech:  normal      Thought Process/Content: Logical      Affective Functioning: Congruent      Mood: congruent      Level of consciousness:  Alert, Oriented x4 and Attentive      Response to Learning: Able to verbalize current knowledge/experience      Endings: None Reported    Modes of Intervention: Education      Discipline Responsible: Psychoeducational Specialist      Signature:  Rock Antony

## 2018-03-07 PROCEDURE — 6370000000 HC RX 637 (ALT 250 FOR IP): Performed by: FAMILY MEDICINE

## 2018-03-07 PROCEDURE — 99233 SBSQ HOSP IP/OBS HIGH 50: CPT | Performed by: PSYCHIATRY & NEUROLOGY

## 2018-03-07 PROCEDURE — 1240000000 HC EMOTIONAL WELLNESS R&B

## 2018-03-07 PROCEDURE — 6370000000 HC RX 637 (ALT 250 FOR IP): Performed by: PSYCHIATRY & NEUROLOGY

## 2018-03-07 RX ADMIN — CYANOCOBALAMIN TAB 500 MCG 500 MCG: 500 TAB at 07:41

## 2018-03-07 RX ADMIN — ZIPRASIDONE HYDROCHLORIDE 80 MG: 80 CAPSULE ORAL at 21:11

## 2018-03-07 RX ADMIN — ZIPRASIDONE HYDROCHLORIDE 20 MG: 20 CAPSULE ORAL at 21:11

## 2018-03-07 NOTE — PLAN OF CARE
Problem: Altered Mood, Depressive Behavior:  Goal: Patient specific goal  Patient specific goal   Outcome: Ongoing                                                                      Group Therapy Note    Date: 3/7/2018  Start Time: 1430  End Time:  7694  Number of Participants: 11    Type of Group: Cognitive Skills    Wellness Binder Information  Module Name:  Emotional wellness  Session Number:  4    Patient's Goal:  Self-esteem    Notes:  Pt acknowledged accomplishments that help improve self-esteem.     Status After Intervention:  Improved    Participation Level: Interactive    Participation Quality: Appropriate, Attentive and Sharing      Speech:  normal      Thought Process/Content: Logical      Affective Functioning: Congruent      Mood: congruent      Level of consciousness:  Alert, Oriented x4 and Attentive      Response to Learning: Able to verbalize current knowledge/experience      Endings: None Reported    Modes of Intervention: Education      Discipline Responsible: Psychoeducational Specialist      Signature:  Mendoza Toure

## 2018-03-07 NOTE — PLAN OF CARE
Problem: Altered Mood, Depressive Behavior:  Goal: Patient specific goal  Patient specific goal   Outcome: Ongoing                                                                      Group Therapy Note    Date: 3/7/2018  Start Time: 0840  End Time:  1600  Number of Participants: 11    Type of Group: Recovery    Wellness Binder Information  Module Name:  Relapse prevention  Session Number:  2    Patient's Goal:  Identifying early warning signs    Notes:  Pt acknowledged avoiding negative thinking to help prevent relapse.     Status After Intervention:  Improved    Participation Level: Interactive    Participation Quality: Appropriate, Attentive and Sharing      Speech:  normal      Thought Process/Content: Logical      Affective Functioning: Congruent      Mood: congruent      Level of consciousness:  Alert, Oriented x4 and Attentive      Response to Learning: Able to verbalize current knowledge/experience      Endings: None Reported    Modes of Intervention: Education      Discipline Responsible: Psychoeducational Specialist      Signature:  Mendoza Toure

## 2018-03-07 NOTE — BH NOTE
Group Therapy Note    Date: 3/7/2018  Start Time: 1330  End Time:  1400  Number of Participants: 9    Type of Group: Spirituality     Wellness Binder Information  Module Name:  Mindfulness  Session Number:      Patient's Goal:  Skills in meditation, relaxation    Notes:      Status After Intervention:  Improved    Participation Level: Interactive    Participation Quality: Attentive and Sharing      Speech:  normal      Thought Process/Content:       Affective Functioning: Congruent      Mood: euthymic      Level of consciousness:  Attentive      Response to Learning: Capable of insight      Endings:     Modes of Intervention: Education, Support and Activity      Discipline Responsible:       Signature:  [facility]

## 2018-03-07 NOTE — PLAN OF CARE
Problem: Altered Mood, Depressive Behavior:  Goal: Able to verbalize acceptance of life and situations over which he or she has no control  Able to verbalize acceptance of life and situations over which he or she has no control   Outcome: Ongoing    Goal: Able to verbalize and/or display a decrease in depressive symptoms  Able to verbalize and/or display a decrease in depressive symptoms   Outcome: Ongoing    Goal: Ability to disclose and discuss suicidal ideas will improve  Ability to disclose and discuss suicidal ideas will improve   Outcome: Ongoing    Goal: Able to verbalize support systems  Able to verbalize support systems   Outcome: Ongoing    Goal: Absence of self-harm  Absence of self-harm   Outcome: Met This Shift    Goal: Patient specific goal  Patient specific goal   Outcome: Ongoing    Goal: Participates in care planning  Participates in care planning   Outcome: Ongoing      Problem: Depressive Behavior With or Without Suicide Precautions:  Goal: Able to verbalize acceptance of life and situations over which he or she has no control  Able to verbalize acceptance of life and situations over which he or she has no control   Outcome: Ongoing    Goal: Able to verbalize and/or display a decrease in depressive symptoms  Able to verbalize and/or display a decrease in depressive symptoms   Outcome: Ongoing    Goal: Ability to disclose and discuss suicidal ideas will improve  Ability to disclose and discuss suicidal ideas will improve   Outcome: Ongoing    Goal: Able to verbalize support systems  Able to verbalize support systems   Outcome: Ongoing    Goal: Absence of self-harm  Absence of self-harm   Outcome: Met This Shift    Goal: Patient specific goal  Patient specific goal   Outcome: Ongoing    Goal: Participates in care planning  Participates in care planning   Outcome: Ongoing      Problem: Altered Mood, Deterioration in Function:  Goal: Ability to perform activities of daily living will improve  Ability

## 2018-03-08 PROCEDURE — 1240000000 HC EMOTIONAL WELLNESS R&B

## 2018-03-08 PROCEDURE — 6370000000 HC RX 637 (ALT 250 FOR IP): Performed by: PSYCHIATRY & NEUROLOGY

## 2018-03-08 PROCEDURE — 6370000000 HC RX 637 (ALT 250 FOR IP): Performed by: FAMILY MEDICINE

## 2018-03-08 PROCEDURE — 99231 SBSQ HOSP IP/OBS SF/LOW 25: CPT | Performed by: PSYCHIATRY & NEUROLOGY

## 2018-03-08 RX ADMIN — ZIPRASIDONE HYDROCHLORIDE 80 MG: 80 CAPSULE ORAL at 20:57

## 2018-03-08 RX ADMIN — CYANOCOBALAMIN TAB 500 MCG 500 MCG: 500 TAB at 08:30

## 2018-03-08 RX ADMIN — ZIPRASIDONE HYDROCHLORIDE 20 MG: 20 CAPSULE ORAL at 20:57

## 2018-03-08 NOTE — PROGRESS NOTES
Group Therapy Note    Date: 3/7/2018  Start Time: 2015  End Time:  2045  Number of Participants: 11    Type of Group: Wrap-Up    Wellness Binder Information  Module Name:    Session Number:      Patient's Goal:      Notes:  Educated about goals and discharge planning    Status After Intervention:      Participation Level:     Participation Quality:       Speech:        Thought Process/Content:       Affective Functioning:     Mood:       Level of consciousness:        Response to Learning:       Endings:     Modes of Intervention:       Discipline Responsible: [address] [place] [address]      Signature:  Crystal Mayers

## 2018-03-08 NOTE — PROGRESS NOTES
Progress Note  Jake Rivero  3/7/2018 9:52 PM  Subjective:   Admit Date:   2/28/2018      CC/ADMIT DX:       Interval History:   Reviewed overnight events and nursing notes. She has had no physical complaints. I have reviewed all labs/diagnostics from the last 24hrs. ROS:   I have done a 10 point ROS and all are negative, except what is mentioned in the HPI. DIET GENERAL;    Medications:      ziprasidone  20 mg Oral Nightly    ziprasidone  80 mg Oral Nightly    vitamin D  50,000 Units Oral Weekly    vitamin B-12  500 mcg Oral Daily           Objective:   Vitals: /87   Pulse 98   Temp 98.6 °F (37 °C)   Resp 16   Ht 5' 2\" (1.575 m)   Wt 130 lb 3.2 oz (59.1 kg)   LMP 02/21/2018   SpO2 99%   BMI 23.81 kg/m²  No intake or output data in the 24 hours ending 03/07/18 2152  General appearance: alert and cooperative with exam  Lungs: clear to auscultation bilaterally  Heart: RRR  Abdomen: soft, non-tender; bowel sounds normal; no masses,  no organomegaly  Extremities: extremities normal, atraumatic, no cyanosis or edema  Neurologic:  No obvious focal neurologic deficits. Assessment and Plan:   Principal Problem:    Severe major depression, single episode, with psychotic features ([address]  Active Problems:    Panic disorder    Cannabis abuse, in remission    Psychosis  Resolved Problems:    * No resolved hospital problems. *    Vit D Def    Plan:  1. Continue present medication(s)   2. She is medically stable. I will monitor for any changes or concerns. 3.  Follow with Psych      Discharge planning:   her home     Reviewed treatment plans with the patient and/or family.              Electronically signed by Dolly Weiss MD on 3/7/2018 at 9:52 PM

## 2018-03-08 NOTE — PROGRESS NOTES
Pt calm and cooperative, A&Ox4 at med [employer]. Pt states she is going home tomorrow, pt states she is excited and ready. Pt is eating and sleeping ok. Pt is social, attends group, and is med compliant. Pt rates depression a 5, anxiety a 2, denies si, hi, and avh.

## 2018-03-08 NOTE — PROGRESS NOTES
[facility]      Psychiatric Progress Note    Name:  Roxana Ahumada  YOB: 2000  Med Rec No:  [id number]  Account No:  [de-identified]  Date:  3/8/2018  Age:  21 y.o. Sex:  female  Ethnicity:   Primary Care Physician:  No primary care provider on file. Patient Care Team:  No care team member to display    Chief Complaint: \"Thinking too much. \"    Historian: patient    History of Present Illness:    Patient seen, chart reviewed, discussed patient progress and care in treatment team meeting. Staff report patient has had no major behavior problems over night, but did have a period of time when she was saying she did not feel real. She refused 20 mg of her Geodon 100 mg daily. No PRNs yesterday. Attending groups. Today, she reports that she feels fine and wants to go home. However, after discussing my concerns with her being able to function in school and her life outside the hospital, she agrees to stay because knows it is the medical opinion that she should stay. Discussed that I did not feel she was a danger to herself or others, but that I was just concerned she would stop her medication, not go to outpatient, and would not function well right now due to her psychosis. While we discussed that she would be signing out against medical advice, she agreed she would stay to try to get better. Talks about thinking too much, and it interfering with things, including reading. Cannot pay attention to people when speaking because she is worried about the past. Asks, \"I'm not a fake patient? \" Sometimes thinks she is because her patient wristband does not have the same doctor's name on it as other patients' and she was allowed to keep her nose piercing. She denies she would want to harm herself. Asks a couple times if she is a fake patient, and if we are keeping her here if she does not actually need to be here.  Says that dad wants her to be discharged and she is scared she will disappoint him by not going home and by needing medication. Feels she has a low self esteem ad is a people pleaser. Talks about previously having tried to link each person in the hospital to someone she knows outside the hospital as a way to figure out the puzzle. Thinks friends are forgetting about her and wonders why they did not bring her school books to her as she asked, but instead brought clothes like she would be here for a long time. Wonders if other people's brains work how hers does. Thinks people will feels she is lying because she cannot adequately explain what she is thinking. Difficulty falling asleep. Appetite is low, which she says is normal for her. Energy is okay. Feels she needs to be more confident. Spoke with dad on the phone for 24 minutes. He wants Ximena Stephens to feel better, even if she needs medication. Says that he would not be disappointed in her. He does however, want her to question her treatment, and not just accept more medication if she does not have reason to be on it. He asks about her diagnosis and is afraid that Ximena Stephens will think she is bipolar if that is even suggested, even if it is not her diagnosis. Says she is easily influenced. ROS: 10 point review of systems is negative except for above. Previous Psychiatric/Substance Use History      Medical History:  History reviewed. No pertinent past medical history. ROBERTS History:   History   Alcohol Use    Yes     Comment: occasional         History   Drug Use    Types: Marijuana        History   Smoking Status    Never Smoker   Smokeless Tobacco    Never Used        Family History:   Mom - bipolar    Vital Signs:  Last set of tests and vitals:  Vitals:    03/08/18 0726   BP: 112/73   Pulse: 81   Resp: 20   Temp: 98.2 °F (36.8 °C)   SpO2: 100%        Physical Exam: Gait steady. Speaks in full sentences without shortness of air.      Mental Status:  Level of consciousness:  within normal limits  Appearance:  well-appearing, good grooming and hygiene, in casual clothes. Behavior/Motor:  no abnormalities noted  Attitude toward examiner:  cooperative and good eye contact  Speech:  spontaneous, normal rate, and well articulated, normal volume  Mood: \"fine\"  Affect:  guarded  Thought processes: [place], goal directed and coherent, future-oriented  Thought content:         Homocidal ideation:  denies                             Suicidal Ideation:  denies        Delusions:  +paranoia, odd thoughts about puzzle solving        Perceptual Disturbance:  denies AVH. Still questions if she is a real patient. Cognition:  oriented to person, place, and time  Concentration and attention: intermittent  Memory: grossly intact  Insight: limited  Judgment:  fair    Current Medications:  Current Facility-Administered Medications   Medication Dose Route Frequency Provider Last Rate Last Dose    ziprasidone (GEODON) capsule 20 mg  20 mg Oral Nightly Hiren Wiley MD   20 mg at 03/07/18 2111    ziprasidone (GEODON) capsule 80 mg  80 mg Oral Nightly Hiren Wiley MD   80 mg at 03/07/18 2111    vitamin D (ERGOCALCIFEROL) capsule 50,000 Units  50,000 Units Oral Weekly Sivakumar Lerma MD   50,000 Units at 03/02/18 1053    vitamin B-12 (CYANOCOBALAMIN) tablet 500 mcg  500 mcg Oral Daily Sivakumar Lerma MD   500 mcg at 03/08/18 0830    traZODone (DESYREL) tablet 50 mg  50 mg Oral Nightly PRN Gege Dumont MD   50 mg at 03/01/18 2111    acetaminophen (TYLENOL) tablet 650 mg  650 mg Oral Q4H PRN Gege Dumont MD   650 mg at 03/02/18 1244    magnesium hydroxide (MILK OF MAGNESIA) 400 MG/5ML suspension 30 mL  30 mL Oral Daily PRN Gege Dumont MD        hydrOXYzine (ATARAX) tablet 25 mg  25 mg Oral TID PRN Porfirio Wellington MD   25 mg at 03/01/18 2000       Psychotherapy: Supportive.      DSM V Diagnoses:    Schizophrenia and other psychotic disorders (unspecified psychosis) - r/o MDD with psychotic features, r/o brief

## 2018-03-09 VITALS
DIASTOLIC BLOOD PRESSURE: 63 MMHG | TEMPERATURE: 97.9 F | HEIGHT: 62 IN | BODY MASS INDEX: 23.96 KG/M2 | OXYGEN SATURATION: 99 % | HEART RATE: 90 BPM | WEIGHT: 130.2 LBS | RESPIRATION RATE: 16 BRPM | SYSTOLIC BLOOD PRESSURE: 105 MMHG

## 2018-03-09 PROCEDURE — 5130000000 HC BRIDGE APPOINTMENT

## 2018-03-09 PROCEDURE — 99238 HOSP IP/OBS DSCHRG MGMT 30/<: CPT | Performed by: PSYCHIATRY & NEUROLOGY

## 2018-03-09 PROCEDURE — 6370000000 HC RX 637 (ALT 250 FOR IP): Performed by: FAMILY MEDICINE

## 2018-03-09 RX ORDER — ERGOCALCIFEROL (VITAMIN D2) 1250 MCG
50000 CAPSULE ORAL WEEKLY
Qty: 11 CAPSULE | Refills: 0 | Status: SHIPPED | OUTPATIENT
Start: 2018-03-09 | End: 2018-05-19

## 2018-03-09 RX ORDER — ZIPRASIDONE HYDROCHLORIDE 80 MG/1
80 CAPSULE ORAL NIGHTLY
Qty: 30 CAPSULE | Refills: 0 | Status: SHIPPED | OUTPATIENT
Start: 2018-03-09

## 2018-03-09 RX ORDER — ZIPRASIDONE HYDROCHLORIDE 20 MG/1
20 CAPSULE ORAL NIGHTLY
Qty: 30 CAPSULE | Refills: 0 | Status: SHIPPED | OUTPATIENT
Start: 2018-03-09

## 2018-03-09 RX ADMIN — ERGOCALCIFEROL 50000 UNITS: 1.25 CAPSULE ORAL at 08:49

## 2018-03-09 RX ADMIN — CYANOCOBALAMIN TAB 500 MCG 500 MCG: 500 TAB at 08:49

## 2018-03-09 NOTE — DISCHARGE INSTR - DIET

## 2018-03-09 NOTE — PROGRESS NOTES
acetaminophen (TYLENOL) tablet 650 mg  650 mg Oral Q4H PRN Shine Palomares MD   650 mg at 03/02/18 1244    magnesium hydroxide (MILK OF MAGNESIA) 400 MG/5ML suspension 30 mL  30 mL Oral Daily PRN Shine Palomares MD        hydrOXYzine (ATARAX) tablet 25 mg  25 mg Oral TID PRN Sweta Castellanos MD   25 mg at 03/01/18 2000       Psychotherapy: Supportive. DSM V Diagnoses:    Schizophrenia and other psychotic disorders (unspecified psychosis) - r/o MDD with psychotic features, r/o brief psychosis, r/o schizophreniform    ACTIVE MEDICAL PROBLEMS:  Patient Active Problem List   Diagnosis    Severe major depression, single episode, with psychotic features ([facility] [address]    Panic disorder    Cannabis abuse, in remission    Psychosis         Plan:   -Continue hospitalization for safety and stabilization  -Monitor every 15 minutes for safety  -The risks, benefits, side effects, indications, contraindications, and adverse effects of the medications have been discussed.  -The patient has verbalized understanding and has capacity to give informed consent.  -Encourage participation in groups and therapeutic activities as appropriate.   -Dr. Erica Reeves is following patient's labs and physical medical problems.   -Continue current medication unchanged.   -Discussed that treatment will need to continue even after hospitalization  -Talked about differential diagnosis  -Continue supportive psychotherapy  -Discuss with Treatment Team    The patient continues to need, on a daily basis, active treatment furnished directly by or requiring the supervision of inpatient psychiatric facility personnel. Amount of time spent with patient:  15 minutes with greater than 50% of the time spent in counseling and collaboration of care.     MD Name: Sofia Cordova, Psychiatrist  Clinician Signature: signed electronically

## 2018-03-10 NOTE — DISCHARGE SUMMARY
list has 2 medication(s) that are the same as other medications prescribed for you. Read the directions carefully, and ask your doctor or other care provider to review them with you. STOP taking these medications    FLUoxetine 10 MG capsule  Commonly known as:  PROZAC     TRINTELLIX 10 MG Tabs tablet  Generic drug:  VORTIoxetine           Where to Get Your Medications      You can get these medications from any pharmacy    Bring a paper prescription for each of these medications  · cyanocobalamin 500 MCG tablet  · ergocalciferol 64916 units capsule  · ziprasidone 20 MG capsule  · ziprasidone 80 MG capsule         Discharge Instructions:  \"Vit D level in 12 weeks---forward to PCP    You were admitted due to paranoia and bizarre thoughts. If you have any hallucinations, loss of functioning at home, paranoia, strange thoughts, wishes to be dead, thoughts to harm yourself, or thoughts to harm anyone else, go to the ER or call 911. If you have new symptoms or worsening symptoms, seek medical attention. \"    Diet:  regular diet    Activity:  As tolerated. Follow up:   PCP 4 weeks  Adilson  3/12/18 11:00 AM with Herminia Gongora  [facility] [address] [place] [address] 3/13/18 at 10:00 AM with Milagro Pedroza  PCP in 12 weeks    Disposition:  home    Time Worked: [x] Up to 30 minutes        [] Greater than 30 minutes    Tejas Castro was admitted to [facility] and acclimated to the unit. A comprehensive evaluation and treatment plan were completed and safety and comfort measures implememented. Home medications were reconciled and controlled substance prescriptions reviewed. Medical History and Physical Examination were completed and labs followed by Dr. Gonzalo Rodriguez and associate, who started her on Vitamin D and B12 supplementation. Prozac was not continued as it can make some feel empty, as she was saying she felt. CARLOS was obtained and reviewed.  Medication